# Patient Record
Sex: MALE | Race: WHITE
[De-identification: names, ages, dates, MRNs, and addresses within clinical notes are randomized per-mention and may not be internally consistent; named-entity substitution may affect disease eponyms.]

---

## 2019-06-10 ENCOUNTER — HOSPITAL ENCOUNTER (INPATIENT)
Dept: HOSPITAL 54 - ER | Age: 84
LOS: 3 days | Discharge: HOME | DRG: 377 | End: 2019-06-13
Attending: NURSE PRACTITIONER | Admitting: NURSE PRACTITIONER
Payer: SELF-PAY

## 2019-06-10 VITALS — BODY MASS INDEX: 29.33 KG/M2 | WEIGHT: 176.04 LBS | HEIGHT: 65 IN

## 2019-06-10 DIAGNOSIS — E87.0: ICD-10-CM

## 2019-06-10 DIAGNOSIS — I10: ICD-10-CM

## 2019-06-10 DIAGNOSIS — I48.92: ICD-10-CM

## 2019-06-10 DIAGNOSIS — E86.0: ICD-10-CM

## 2019-06-10 DIAGNOSIS — Z91.14: ICD-10-CM

## 2019-06-10 DIAGNOSIS — E87.2: ICD-10-CM

## 2019-06-10 DIAGNOSIS — K29.21: Primary | ICD-10-CM

## 2019-06-10 DIAGNOSIS — F10.229: ICD-10-CM
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
## 2019-06-10 DIAGNOSIS — N17.0: ICD-10-CM

## 2019-06-10 DIAGNOSIS — K76.6: ICD-10-CM

## 2019-06-10 DIAGNOSIS — E11.9: ICD-10-CM

## 2019-06-10 DIAGNOSIS — E87.6: ICD-10-CM

## 2019-06-10 LAB
ALBUMIN SERPL BCP-MCNC: 3.8 G/DL (ref 3.4–5)
ALP SERPL-CCNC: 92 U/L (ref 46–116)
ALT SERPL W P-5'-P-CCNC: 19 U/L (ref 12–78)
APAP SERPL-MCNC: < 10 UG/ML (ref 10–30)
AST SERPL W P-5'-P-CCNC: 32 U/L (ref 15–37)
BASOPHILS # BLD AUTO: 0 /CMM (ref 0–0.2)
BASOPHILS NFR BLD AUTO: 0.5 % (ref 0–2)
BILIRUB DIRECT SERPL-MCNC: 0.2 MG/DL (ref 0–0.2)
BILIRUB SERPL-MCNC: 0.6 MG/DL (ref 0.2–1)
BUN SERPL-MCNC: 20 MG/DL (ref 7–18)
CALCIUM SERPL-MCNC: 8.2 MG/DL (ref 8.5–10.1)
CHLORIDE SERPL-SCNC: 108 MMOL/L (ref 98–107)
CO2 SERPL-SCNC: 19 MMOL/L (ref 21–32)
CREAT SERPL-MCNC: 0.7 MG/DL (ref 0.6–1.3)
EOSINOPHIL NFR BLD AUTO: 0.8 % (ref 0–6)
ETHANOL SERPL-MCNC: 143 MG/DL (ref 0–0)
GLUCOSE SERPL-MCNC: 112 MG/DL (ref 74–106)
HCT VFR BLD AUTO: 39 % (ref 39–51)
HGB BLD-MCNC: 13 G/DL (ref 13.5–17.5)
LYMPHOCYTES NFR BLD AUTO: 0.8 /CMM (ref 0.8–4.8)
LYMPHOCYTES NFR BLD AUTO: 9.3 % (ref 20–44)
MCHC RBC AUTO-ENTMCNC: 33 G/DL (ref 31–36)
MCV RBC AUTO: 82 FL (ref 80–96)
MONOCYTES NFR BLD AUTO: 0.3 /CMM (ref 0.1–1.3)
MONOCYTES NFR BLD AUTO: 3.3 % (ref 2–12)
NEUTROPHILS # BLD AUTO: 7.2 /CMM (ref 1.8–8.9)
NEUTROPHILS NFR BLD AUTO: 86.1 % (ref 43–81)
PLATELET # BLD AUTO: 228 /CMM (ref 150–450)
POTASSIUM SERPL-SCNC: 2.8 MMOL/L (ref 3.5–5.1)
PROT SERPL-MCNC: 8.1 G/DL (ref 6.4–8.2)
RBC # BLD AUTO: 4.77 MIL/UL (ref 4.5–6)
SALICYLATES SERPL-MCNC: 1.2 MG/DL (ref 2.8–20)
SODIUM SERPL-SCNC: 147 MMOL/L (ref 136–145)
WBC NRBC COR # BLD AUTO: 8.4 K/UL (ref 4.3–11)

## 2019-06-10 PROCEDURE — G0378 HOSPITAL OBSERVATION PER HR: HCPCS

## 2019-06-10 PROCEDURE — A6402 STERILE GAUZE <= 16 SQ IN: HCPCS

## 2019-06-10 PROCEDURE — C9113 INJ PANTOPRAZOLE SODIUM, VIA: HCPCS

## 2019-06-10 PROCEDURE — G0480 DRUG TEST DEF 1-7 CLASSES: HCPCS

## 2019-06-10 RX ADMIN — POTASSIUM CHLORIDE SCH MLS/HR: 200 INJECTION, SOLUTION INTRAVENOUS at 22:30

## 2019-06-10 RX ADMIN — POTASSIUM CHLORIDE SCH MLS/HR: 200 INJECTION, SOLUTION INTRAVENOUS at 23:25

## 2019-06-10 NOTE — NUR
BIBRA[address] FROM [address]. RECENTLY SEEN AND DISCHARGED FROM [facility] FOR ETOH BS 
90. REC'D 200ML NS EN ROUTE.  LIMITED RESPONSE FROM PT, ONLY GIVING YES/NO 
GESTURES.  NO ACUTE DISTRESS NOTED.  READY FOR EVAL AND WILL CONT TO OBSERVE

## 2019-06-10 NOTE — NUR
Patient is resting comfortably in bed with eyes closed. Easily aroused. VSS.  
REQUESTING WATER.  MD NOTIFIED

## 2019-06-11 VITALS — DIASTOLIC BLOOD PRESSURE: 70 MMHG | SYSTOLIC BLOOD PRESSURE: 134 MMHG

## 2019-06-11 VITALS — DIASTOLIC BLOOD PRESSURE: 99 MMHG | SYSTOLIC BLOOD PRESSURE: 172 MMHG

## 2019-06-11 VITALS — DIASTOLIC BLOOD PRESSURE: 71 MMHG | SYSTOLIC BLOOD PRESSURE: 121 MMHG

## 2019-06-11 VITALS — DIASTOLIC BLOOD PRESSURE: 87 MMHG | SYSTOLIC BLOOD PRESSURE: 139 MMHG

## 2019-06-11 VITALS — DIASTOLIC BLOOD PRESSURE: 77 MMHG | SYSTOLIC BLOOD PRESSURE: 166 MMHG

## 2019-06-11 LAB
APPEARANCE UR: CLEAR
BASOPHILS # BLD AUTO: 0 /CMM (ref 0–0.2)
BASOPHILS NFR BLD AUTO: 0.4 % (ref 0–2)
BILIRUB UR QL STRIP: NEGATIVE
BUN SERPL-MCNC: 20 MG/DL (ref 7–18)
CALCIUM SERPL-MCNC: 8 MG/DL (ref 8.5–10.1)
CHLORIDE SERPL-SCNC: 108 MMOL/L (ref 98–107)
CHOLEST SERPL-MCNC: 207 MG/DL (ref ?–200)
CO2 SERPL-SCNC: 20 MMOL/L (ref 21–32)
COLOR UR: YELLOW
CREAT SERPL-MCNC: 0.6 MG/DL (ref 0.6–1.3)
EOSINOPHIL NFR BLD AUTO: 0.2 % (ref 0–6)
GLUCOSE SERPL-MCNC: 84 MG/DL (ref 74–106)
GLUCOSE UR STRIP-MCNC: NEGATIVE MG/DL
HCT VFR BLD AUTO: 39 % (ref 39–51)
HDLC SERPL-MCNC: 54 MG/DL (ref 40–60)
HGB BLD-MCNC: 12.9 G/DL (ref 13.5–17.5)
HGB UR QL STRIP: (no result) ERY/UL
KETONES UR STRIP-MCNC: (no result) MG/DL
LDLC SERPL DIRECT ASSAY-MCNC: 146 MG/DL (ref 0–99)
LEUKOCYTE ESTERASE UR QL STRIP: NEGATIVE
LYMPHOCYTES NFR BLD AUTO: 0.9 /CMM (ref 0.8–4.8)
LYMPHOCYTES NFR BLD AUTO: 12.6 % (ref 20–44)
MAGNESIUM SERPL-MCNC: 2 MG/DL (ref 1.8–2.4)
MCHC RBC AUTO-ENTMCNC: 34 G/DL (ref 31–36)
MCV RBC AUTO: 82 FL (ref 80–96)
MONOCYTES NFR BLD AUTO: 0.4 /CMM (ref 0.1–1.3)
MONOCYTES NFR BLD AUTO: 4.8 % (ref 2–12)
NEUTROPHILS # BLD AUTO: 6 /CMM (ref 1.8–8.9)
NEUTROPHILS NFR BLD AUTO: 82 % (ref 43–81)
NITRITE UR QL STRIP: NEGATIVE
PH UR STRIP: 5.5 [PH] (ref 5–8)
PHOSPHATE SERPL-MCNC: 4.4 MG/DL (ref 2.5–4.9)
PLATELET # BLD AUTO: 212 /CMM (ref 150–450)
POTASSIUM SERPL-SCNC: 3.3 MMOL/L (ref 3.5–5.1)
PROT UR QL STRIP: (no result) MG/DL
RBC # BLD AUTO: 4.67 MIL/UL (ref 4.5–6)
RBC #/AREA URNS HPF: (no result) /HPF (ref 0–2)
SODIUM SERPL-SCNC: 147 MMOL/L (ref 136–145)
TRIGL SERPL-MCNC: 71 MG/DL (ref 30–150)
UROBILINOGEN UR STRIP-MCNC: 1 EU/DL
WBC #/AREA URNS HPF: (no result) /HPF (ref 0–3)
WBC NRBC COR # BLD AUTO: 7.4 K/UL (ref 4.3–11)

## 2019-06-11 RX ADMIN — PANTOPRAZOLE SODIUM SCH MG: 40 TABLET, DELAYED RELEASE ORAL at 19:52

## 2019-06-11 RX ADMIN — POTASSIUM CHLORIDE SCH MEQ: 1500 TABLET, EXTENDED RELEASE ORAL at 11:13

## 2019-06-11 RX ADMIN — SODIUM CHLORIDE PRN MLS/HR: 9 INJECTION, SOLUTION INTRAVENOUS at 20:09

## 2019-06-11 RX ADMIN — POTASSIUM CHLORIDE SCH MEQ: 1500 TABLET, EXTENDED RELEASE ORAL at 12:58

## 2019-06-11 RX ADMIN — METOPROLOL TARTRATE SCH MG: 50 TABLET, FILM COATED ORAL at 17:16

## 2019-06-11 RX ADMIN — MAGNESIUM SULFATE IN DEXTROSE SCH MLS/HR: 10 INJECTION, SOLUTION INTRAVENOUS at 03:58

## 2019-06-11 RX ADMIN — POTASSIUM CHLORIDE SCH MEQ: 1500 TABLET, EXTENDED RELEASE ORAL at 11:55

## 2019-06-11 RX ADMIN — METOPROLOL TARTRATE SCH MG: 50 TABLET, FILM COATED ORAL at 11:13

## 2019-06-11 RX ADMIN — MAGNESIUM SULFATE IN DEXTROSE SCH MLS/HR: 10 INJECTION, SOLUTION INTRAVENOUS at 04:15

## 2019-06-11 RX ADMIN — PANTOPRAZOLE SODIUM SCH MG: 40 TABLET, DELAYED RELEASE ORAL at 21:00

## 2019-06-11 RX ADMIN — AMLODIPINE BESYLATE SCH MG: 10 TABLET ORAL at 17:59

## 2019-06-11 NOTE — NUR
RN NOTES:

RECEIVED CALL FROM [employer] THAT PT'S HR NNOW 130, RN/I PERSONALLY SITTING OUTSIDE 
PT'S ROOM TO MONTIOR PT FOR ANY S/S OF ALCOHOL WITHDRAWAL AND HE'S FALL RISK, ASSESSED PT, 
PER PT DENIES ANY HEAD ACHE DIZZINESS OR LIGHT HEADEDNESS, DENIES ANY PAIN OR DISCOMFORT, 
ALL COMMUNICATION TRANSLATED IN Filipino

## 2019-06-11 NOTE — NUR
Pt awake, requesting water, given flds, sitting up in bed drinking w/ no 
aspiration of flds noted. pt then goes back to sleep, on continuous pulse-ox w/ 
cardiac monitoring.

## 2019-06-11 NOTE — NUR
TELE RN NOTES

FOUND PT SITTING ON SIDE OF BED, L HAND IV PULLED OUT. BLOOD ON FLOOR AND ON SHEETS. PT WAS 
EATING AND DID NOT CALL FOR ASSISTANCE. APPLIED PRESSURE ON IV SITE AND APPLIED BAND AID. 
WILL CONT TO MONITOR

## 2019-06-11 NOTE — NUR
Social service consult requested by Dr. Madera for possible homelessness. Pt. is a 84 year 
old male who was admitted to [facility] for GI Bleed. SW met with pt. bedside. Pt. is Frisian 
speaking. Unit secretary Salina assisted SW in translation. Pt. is alert and oriented x 3. 
Pt. appeared disheveled. Pt. states he lives with his wife and daughter at [address] [place] [address], 
[address] in [facility]. Pt. is wheelchair bound for the last 36 years. Pt's emergency 
contact is Geraldine [phone]. The contact number on face sheet is incorrect. Pt. states he 
only drinks beer on Saturdays. Pt. to be discharged back home once medically cleared. RICHARD 
contacted pt's daughter Geraldine [phone] to confirm if pt's resides with her. Geraldine 
confirmed that pt. can return back home and informed SW that pt. is an alcoholic and is 
minimizing how much he drinks. RICHARD updated pt's  Gi Rivero with aforementioned 
information. No other social service needs are required at this time.

## 2019-06-11 NOTE — NUR
Report given to JEAN-PAUL Ramesh for pt admission to [facility] 307-2 for QUINCY. pt lying on 
rt side, w/ resp even & unlabored, on continuous monitoring.

## 2019-06-11 NOTE — NUR
ADMISSION NOTES:

RECEIVED REPORT FROM ER RN, PT BROUGHT TO THE UNIT VIA MILAGROS, A/O X4, ON 3L OXYGEN VIA NC 
RESPIRATION EVEN AND UNLABORED. IV ACCESS PATENT AND FLUSHING WELL, INFUSING WITH 
SANDOSTATIN CONTINUOUS BOLUS AT 10ML/HR. ORIENTED PT TO UNIT POLICY AND HOURLY ROUNDING, USE 
OF CALL LIGHT. SKIN ASSESSMENT PERFORMED. INVENTORY OF BELONGING COMPLETED BY BRIA TEAGUE. 
VS TAKEN AND RECORDED. TELE MONITORING PLACED SINUS RHYTHM HR 84. SAFETY PRECAUTIONS FOR 
FALL INITIATED, CALL LIGHT IN REACH, WILL CONTINUE MONITORING PT.

## 2019-06-11 NOTE — NUR
RN NOTES:

UNABLE TO ADMINISTER SCHEDULED MEDS FOR 0600 SUCH AS THIAMINE AND FOLIC ACID IV BECAUSE 
RIGHT IV ACCESS PT IS RECEIVING SANDOSTATIN IV 10ML/HR (50MCG), AND LEFT HAND IV ACCESS 
PATIENT IS RECEIVING KCL REPLACEMENT, INFORMED RN VERA MANNING, RESTARTED NEW IV ACCESS ON 
LEFT WRIST FOR IV NS FLUID ORDER

## 2019-06-11 NOTE — NUR
pt medicated as ordered for N/V, w/ IVPB Mg, labs drawn & sent. Urinal 
provided, urine obtained & sent to lab. pt on continuous O2 w/ cardiac 
monitoring.

## 2019-06-11 NOTE — NUR
TELE RN INITIAL NOTES

RECEIVED PT IN BED, ASLEEP BUT EASILY AROUSABLE. ON TELE PT IS SR. L HAND #20, L WRIST #18, 
R HAND #22 C/D/P/I. BED IN LOCKED/LOWEST POSITION. CALL LIGHT IN REACH. WILL CONT TO 
MONITOR.

## 2019-06-11 NOTE — NUR
RN NOTES:

PER ER REPORT PT WAS GIVEN 2 BAGS OF MAGNESIUM IN ER, SHE CLAIMED THEY DON NOT SCAN MEDS IN 
ER, BUT 2 BAGS WERE GIVEN

## 2019-06-11 NOTE — NUR
RN NOTES:

SPOKED WITH PHARMACIST STATED PUT NON ADMIN FOR FOLIC ACID AND THIAMINE AS SHE WILL RESCHED 
BOTH MEDS FOR 0900AM

## 2019-06-11 NOTE — NUR
RN CLOSING NOTES:

PT REMAINS A/O X3 ON 3L OXYGEN VIA NC, DENIES ANY PAIN OR DISCOMFORT AT THIS TIME. IV ACCESS 
REMAINS PATENT AND FLUSHING WELL, INFUSING WITH SANDOSTATIN CONTINUOUS AT 10ML/HR (50 MCG), 
AND THE OTHER IV ACCESS WITH ONGOING KCL REPLACEMENT. VS REMAINS STABLE, NEEDS ATTENDED. 
SAFETY PRECAUTIONS FOR FALL INITIATED, CALL LIGHT IN REACH, WILL ENDORSE TO DAY RN FOR 
CONTINUITY OF CARE.

## 2019-06-11 NOTE — NUR
RN INITIAL NOTES:

RECEIVED REPORT FROM PRIYANKA JEONG. PT SLEEPING, RESPONDS TO TACTILE STIMULI, PT A/O X3 Latvian 
SPEAKING ONLY ABLE TO MAKE NEEDS KNOWN, URINAL AT BED SIDE, PT DENIES ANY PAIN OR DISCOMFORT 
AT THIS TIME. TELE MONITORING SINUS ASHLEY HR 51, PER REPORT THIS AM PT WENT TO [facility] [place] 
TO A FLUTTER, CARDIOLOGIST MADE AWARE. IV ACCESS PATENT AND FLUSHING WELL, RIGHT WRIST IV 
ACCESS INFUSING WITH SANDOSTATIN 50MCG AT 10ML/HR, ANOTHER IV ACCESS INFUSING WITH NS AT 
100ML/HR. SAFETY PRECAUTIONS FOR FALL INITIATED, CALL LIGHT IN REACH, WILL MONITOR PT FOR 
ANY S/S OF ALCOHOL WITHDRAWAL.

## 2019-06-11 NOTE — NUR
RN NOTES:

IV ACCESS FROM ER GOT PULLED OUT BY PT ACCIDENTALLY, RESTARTED NEW IV ACCESS ON RIGHT HAND G 
22, AND LEFT HAND G 20 BOTH WITH GOOD BLOOD RETURN, COVERED WITH TEGADERM, PROPER LABELS 
ATTACHED,

## 2019-06-11 NOTE — NUR
RN NOTES:

NOTIFIED ON CALL MD REGARDING HEART RHYTHM OF PT SINUS ASHLEY 50-60'S NO NEW ORDERS FROM MD, 
WILL CONTINUE MONITORING. PT ON IVF 125ML/HR, DENIES ANY PAIN OR DISCOMFORT, DENIES ANY 
DIZZINESS OR LIGHT HEADEDNESS, ASYMPTOMATIC

## 2019-06-11 NOTE — NUR
TELE RN NOTES

LAB CALLED RE: DOUBLE ORDER FOR CBC, BMP IN AM. WILL CHECK WITH HOSPITALIST TODAY. 
INSTRUCTED  TO CANCEL FOR NOW.

## 2019-06-11 NOTE — NUR
TELE RN END OF SHIFT NOTES

PT STABLE IN BED. NO C/O PAIN. NO S/SX OF RESP DISTRESS. WILL ENDORSE TO PM NURSE FOR QUINCY.

## 2019-06-11 NOTE — NUR
Pt continues to sleep w/ resp even & unlabored, IV potassium continues to be 
infusing w/ no infiltration, phlebitis at IV insertion site noted. pt on 
continuous pulse-ox w/ cardiac monitoring.

## 2019-06-12 VITALS — SYSTOLIC BLOOD PRESSURE: 130 MMHG | DIASTOLIC BLOOD PRESSURE: 69 MMHG

## 2019-06-12 VITALS — DIASTOLIC BLOOD PRESSURE: 66 MMHG | SYSTOLIC BLOOD PRESSURE: 135 MMHG

## 2019-06-12 VITALS — SYSTOLIC BLOOD PRESSURE: 124 MMHG | DIASTOLIC BLOOD PRESSURE: 70 MMHG

## 2019-06-12 VITALS — SYSTOLIC BLOOD PRESSURE: 128 MMHG | DIASTOLIC BLOOD PRESSURE: 70 MMHG

## 2019-06-12 VITALS — SYSTOLIC BLOOD PRESSURE: 123 MMHG | DIASTOLIC BLOOD PRESSURE: 57 MMHG

## 2019-06-12 VITALS — DIASTOLIC BLOOD PRESSURE: 70 MMHG | SYSTOLIC BLOOD PRESSURE: 124 MMHG

## 2019-06-12 VITALS — SYSTOLIC BLOOD PRESSURE: 120 MMHG | DIASTOLIC BLOOD PRESSURE: 72 MMHG

## 2019-06-12 LAB
ALBUMIN SERPL BCP-MCNC: 3.1 G/DL (ref 3.4–5)
ALP SERPL-CCNC: 76 U/L (ref 46–116)
ALT SERPL W P-5'-P-CCNC: 18 U/L (ref 12–78)
AST SERPL W P-5'-P-CCNC: 22 U/L (ref 15–37)
BASOPHILS # BLD AUTO: 0 /CMM (ref 0–0.2)
BASOPHILS NFR BLD AUTO: 0.3 % (ref 0–2)
BILIRUB SERPL-MCNC: 1.3 MG/DL (ref 0.2–1)
BUN SERPL-MCNC: 10 MG/DL (ref 7–18)
CALCIUM SERPL-MCNC: 8.2 MG/DL (ref 8.5–10.1)
CHLORIDE SERPL-SCNC: 104 MMOL/L (ref 98–107)
CO2 SERPL-SCNC: 29 MMOL/L (ref 21–32)
CREAT SERPL-MCNC: 0.5 MG/DL (ref 0.6–1.3)
EOSINOPHIL NFR BLD AUTO: 4 % (ref 0–6)
GLUCOSE SERPL-MCNC: 112 MG/DL (ref 74–106)
HCT VFR BLD AUTO: 35 % (ref 39–51)
HGB BLD-MCNC: 11.9 G/DL (ref 13.5–17.5)
LYMPHOCYTES NFR BLD AUTO: 0.9 /CMM (ref 0.8–4.8)
LYMPHOCYTES NFR BLD AUTO: 17.9 % (ref 20–44)
MAGNESIUM SERPL-MCNC: 2 MG/DL (ref 1.8–2.4)
MCHC RBC AUTO-ENTMCNC: 34 G/DL (ref 31–36)
MCV RBC AUTO: 82 FL (ref 80–96)
MONOCYTES NFR BLD AUTO: 0.3 /CMM (ref 0.1–1.3)
MONOCYTES NFR BLD AUTO: 6.6 % (ref 2–12)
NEUTROPHILS # BLD AUTO: 3.6 /CMM (ref 1.8–8.9)
NEUTROPHILS NFR BLD AUTO: 71.2 % (ref 43–81)
PHOSPHATE SERPL-MCNC: 2.3 MG/DL (ref 2.5–4.9)
PLATELET # BLD AUTO: 159 /CMM (ref 150–450)
POTASSIUM SERPL-SCNC: 4.4 MMOL/L (ref 3.5–5.1)
PROT SERPL-MCNC: 6.7 G/DL (ref 6.4–8.2)
RBC # BLD AUTO: 4.31 MIL/UL (ref 4.5–6)
SODIUM SERPL-SCNC: 139 MMOL/L (ref 136–145)
WBC NRBC COR # BLD AUTO: 5.1 K/UL (ref 4.3–11)

## 2019-06-12 RX ADMIN — Medication SCH MG: at 09:19

## 2019-06-12 RX ADMIN — METOPROLOL TARTRATE SCH MG: 50 TABLET, FILM COATED ORAL at 05:57

## 2019-06-12 RX ADMIN — PANTOPRAZOLE SODIUM SCH MG: 40 TABLET, DELAYED RELEASE ORAL at 09:19

## 2019-06-12 RX ADMIN — SODIUM CHLORIDE PRN MLS/HR: 9 INJECTION, SOLUTION INTRAVENOUS at 04:06

## 2019-06-12 RX ADMIN — METOPROLOL TARTRATE SCH MG: 50 TABLET, FILM COATED ORAL at 09:00

## 2019-06-12 RX ADMIN — FOLIC ACID SCH MG: 1 TABLET ORAL at 09:20

## 2019-06-12 RX ADMIN — METOPROLOL TARTRATE SCH MG: 50 TABLET, FILM COATED ORAL at 17:48

## 2019-06-12 RX ADMIN — METOPROLOL TARTRATE SCH MG: 50 TABLET, FILM COATED ORAL at 00:00

## 2019-06-12 RX ADMIN — AMLODIPINE BESYLATE SCH MG: 10 TABLET ORAL at 09:24

## 2019-06-12 RX ADMIN — PANTOPRAZOLE SODIUM SCH MG: 40 TABLET, DELAYED RELEASE ORAL at 21:12

## 2019-06-12 NOTE — NUR
RN NOTES 



WAS ABLE TO GET A HOLD OF Pt's DAUGHTER LOLI. SHE SAID SHE WAS UNABLE TO COME GET HIM 
TONIGHT DUE TO NOT HAVING THE CAR. INFORMED THE DAUGHTER THAT SHE MUST COME PICK HIM UP 
TOMORROW MORNING BEFORE 10AM. DAUGHTER SAID SHE WILL TRY HER BEST, AND IF SHE CANNOT GET THE 
CAR BACK IN TIME, THEN SHE WILL HAVE HER MOTHER (Pt's WIFE) TO COME GET HIM VIA [employer]AXI.

## 2019-06-12 NOTE — NUR
RN OPENING NOTES



RECEIVED REPORT FROM DAYSHIFT JEAN-PAUL MACE. FOUND Pt AWAKE, RESTING IN BED, WATCHING TV. NO S/S 
OF ACUTE DISTRESS OR SOB NOTED. PER REPORT Pt IS BEING DISCHARGED TONIGHT, AND  IS 
ARRANGED BY DAUGHTER WHO WILL BE HERE TONIGHT TO  HER FATHER. Pt IS A/OX3, VERBAL, 
Telugu SPEAKING ONLY, WITH PERIODS OF CONFUSION AND FORGETFULNESS. IV ACCESS ON LWRIST 
#18G, SL & R WRIST #20G, SL. WILL WAIT UNTIL DAUGHTER ARRIVES TO REMOVE Pt's IVs. SAFETY 
MEASURES IN PLACE. BED LOW, LOCKED, HOB ELEVATED, SIDE RAILS UP, CALL LIGHT AND BEDSIDE 
TABLE WITHIN REACH. BED ALARM ON. WILL CONTINUE TO MONITOR Pt's CONDITION AND SAFETY UNTIL 
Pt IS DISCHARGED.

## 2019-06-12 NOTE — NUR
RN CLOSING NOTES:

PT IN BED, REMAINS ON RA RESPIRATION EVEN AND UNLABORED. ON SINUS ASHLEY HR 52, LOWEST 40. IV 
ACCESS REMAINS PATENT AND FLUSHING WELL, INFUSING WITH NS AT 100ML/HR. NO EPISODE OF 
VOMITING NOTED THROUGHOUT THE SHIFT. VS REMAINS STABLE, NEEDS ATTENDED. URINAL AT BED SIDE. 
FOR DC IN AM WITH DAUGHTER, EXIT CARE FILLED OUT, FOR DAY RN TO COMPLETE DATA FOR V, RX, AND 
OTHER FOLLOW UP,  PRIOR TO PT'S DC. SAFETY PRECAUTIONS FOR FALL REMAINS ENGAGED, CALL LIGHT 
IN REACH, WILL ENDORSE TO DAY RN FOR CONTINUITY OF CARE.

## 2019-06-12 NOTE — NUR
RN NOTES



INFORMED ON CALL HOSPITALIST NP BHARATHI JACKSON, THAT Pt's DAUGHTER LOLI WAS SUPPOSE TO COME 
TONIGHT TO  Pt FOR DISCHARGE, HOWEVER DAUGHTER HAS NOT YET ARRIVED, AND HAVE 
ATTEMPTED MULTIPLE TIMES CALLING HER. ALSO LEFT VOICEMAIL IN ENGLISH & Hungarian. BUT NO 
ANSWER FROM FAMILY. PER NP SAID OK TO HOLD D/C FOR TONIGHT, & LET Pt STAY UNTIL TOMORROW 
MORNING. WILL ENDORSE TO DAYSHIFT RN.

## 2019-06-12 NOTE — NUR
ms rn

receive don bed, awake,alert,oriented x3, w/ period s of confusion, not in any form of 
distress. respirations even and unlabored,no sob noted.denies pain at this time, will 
monitor patient.

## 2019-06-12 NOTE — NUR
NON ADMIN OF LOPRESSOR:

PT'S HR 49, SINUS ASHLEY HR RANGING 45-49, LOWEST 43, LOPRESSOR NOT ADMINISTER AT THIS TIME, 
PER PARAMETER TO HOLD IF HR <50

## 2019-06-13 VITALS — SYSTOLIC BLOOD PRESSURE: 99 MMHG | DIASTOLIC BLOOD PRESSURE: 58 MMHG

## 2019-06-13 VITALS — DIASTOLIC BLOOD PRESSURE: 77 MMHG | SYSTOLIC BLOOD PRESSURE: 136 MMHG

## 2019-06-13 RX ADMIN — Medication SCH MG: at 08:37

## 2019-06-13 RX ADMIN — AMLODIPINE BESYLATE SCH MG: 10 TABLET ORAL at 08:38

## 2019-06-13 RX ADMIN — METOPROLOL TARTRATE SCH MG: 50 TABLET, FILM COATED ORAL at 08:37

## 2019-06-13 RX ADMIN — FOLIC ACID SCH MG: 1 TABLET ORAL at 08:37

## 2019-06-13 RX ADMIN — PANTOPRAZOLE SODIUM SCH MG: 40 TABLET, DELAYED RELEASE ORAL at 08:37

## 2019-06-13 NOTE — NUR
RN CLOSING NOTES



NO SIGNIFICANT CHANGES IN Pt's CONDITION. Pt REMAINS STABLE AT THIS TIME. NO S/S OF ACUTE 
DISTRESS OR SOB NOTED DURING THE NIGHT. ALL NEEDS MET AND ATTENDED TO. Pt RESTING IN BED, 
RESPIRATIONS EVEN AND UNLABORED WITH EQUAL CHEST RISE AND FALL. SAFETY MEASURES IN PLACE. 
WILL ENDORSE TO DAYSHIFT RN FOR Pt's QUINCY. DISCHARGE IN THE AM TODAY.

## 2019-06-13 NOTE — NUR
MS RN

RECEIVED ON BED, AWAKE,ALERT,ORIENTED X3,NOT IN ANY FORM OF DISTRESS, RESPIRATIONS EVEN AND 
UNLABORED,NO SOB NOTED, PATIENT READY TO GO HOME, WAS NOT ABLE TO GO HOME LAST NIGHT DUE TO 
DAUGHTER HAVE NO CAR.